# Patient Record
(demographics unavailable — no encounter records)

---

## 2024-12-16 NOTE — BEGINNING OF VISIT
[0] : 2) Feeling down, depressed, or hopeless: Not at all (0) [PHQ-2 Negative] : PHQ-2 Negative [PHQ-9 Negative] : PHQ-9 Negative [Former] : Former [> 15 Years] : > 15 Years [Date Discussed (MM/DD/YY): ___] : Discussed: [unfilled] [Reviewed, no changes] : Reviewed, no changes [Pain Scale: ___] : On a scale of 1-10, today the patient's pain is a(n) [unfilled].

## 2024-12-17 NOTE — HISTORY OF PRESENT ILLNESS
[Disease: _____________________] : Disease: [unfilled] [T: ___] : T[unfilled] [N: ___] : N[unfilled] [M: ___] : M[unfilled] [AJCC Stage: ____] : AJCC Stage: [unfilled] [Therapy: ___] : Therapy: [unfilled] [2] : 2, Moderate [E] : definite [de-identified] : Ms. MOJGAN STRINGER is a 69 year old female here today for evaluation and management of Pancreatic Cancer.\par  \par  She was referred by Dr. Tavera.  She is a 70 yo F with recently diagnosed stage IIB pancreatic adenocarcinoma (dx 1/2020) s/p Whipple (2/12/20) presents for initial visit to discuss adjuvant chemotherapy accompanied by her sister and spouse.   She initially presented to hospital back in January 2020 with an acute episode of epigastric and mid back pain.  Workup revealed cholecystitis and associated episode of pancreatitis with lipase level in the 2000's, presumed to be gallstone pancreatitis.  CT and MRI at that time demonstrated a mildly dilated CBD with narrowing at the mid CBD but no definitive mass identified.  Pancreatic duct was not dilated and bilirubin was WNL.  She underwent ERCP/EUS which showed a 2.3 x 2.2 cm mass at the distal CBD within the parenchyma of the pancreatic head s/p FNB.  Pathology was positive for adenocarcinoma.  Ca 19-9 was 96 pre-operatively and CEA normal.  She had lost approximately 6 lbs, c/o early satiety but no nausea or vomiting.  She does report long-standing constipation and was prescribed Creon but has not started yet.  She was evaluated by Dr. Garcia and underwent Whipple on 2/12/20. Pathology consistent with moderately differentiated PDAC, negative margins, 2/37 LNs positive, MMR intact, pT2N1 stage IIB (see below). She is pending re imaging scans next week and is anticipating the start of chemotherapy.   Her post Whipple CA 19-9 was normal  33 from 3/10/20. \par  \par  She is recovering well. She does complain of constipation but this has been a lifetime ordeal. She is now on Creon  and states its a bit expensive.\par  \par  RADIOLOGIC WORKUP\par  CT Chest (1.31.2020) Impression: 1 cm ill-defined opacity as well as a 0.6 cm solid nodule in the right upper lobe are noted. They are indeterminate based on this exam. Follow-up CT scan is recommended in 3 months to ensure resolution.\par  MR Abd (1.23.2020) IMPRESSION:Findings likely representing a mid-CBD stricture. No evidence of choledocholithiasis. CT from 1/21/2020 demonstrates hyperattenuation/enhancement at this site. Top normal CBD and minimal intrahepatic biliary ductal prominence.  Further evaluation with ERCP is recommended.\par  CT A/P (1.21.2020) IMPRESSION:Gallbladder mucosal enhancement, wall thickening and pericholecystic fluid suggestive of acute cholecystitis. Since the gallbladder is not distended, gallbladder sonography and/or a nuclear medicine hepatobiliary scan may be useful.\par  \par  HCM\par  Upper EGD done 1.27.2020\par  Last colonoscopy (04/2019) reportedly 25+ polyps were removed, none with high grade dysplasia. \par  \par  PATHOLOGY\par  (2.6.2020) Submitted Specimen(s)\par  CONSULTATION: PANCREAS, MASS HEAD, FNA LABELED 63-IY-\par  \par  Submitted Gross Description\par  Consultation.Specimen: Pancreatic Mass, Head, EUS-Guided FNA.\par  Collected 01/27/2020. Received 02/06/2020 are 8 Slides labelled\par  87- Through 99-FZ--1H. All slides labeled with\par  patient's name: Mojgan Stringer from [facility]. [address] [place] [address]. [phone]  [phone]. This review is requested by Dr. Jose Juan Garcia.\par  \par  The interpretation from the originating lab (accession#[id number] Through 1H) reads as follows:  Positive for malignant cells. Adenocarcinoma.\par  \par  Note: Cytology sides show disorganized crowded groups of malignant cells, displaying anisonucleosis, irregular chromatin, and prominent nucleoli. Biopsies show infiltrative glandular epithelium in a desmoplastic stroma, supporting the above interpretation.\par  \par  Submitted Clinical Information Abdominal pain, pancreatitis, 2 cm head mass.\par  \par  Final Diagnosis\par  CONSULTATION: PANCREAS, MASS HEAD, FNA, LABELED 99-AH-\par  POSITIVE FOR MALIGNANT CELLS.\par  Adenocarcinoma. See Note\par  \par  The cytology slides show rare clusters of atypical ductal epithelium with "drunken honey comb" architectural pattern and cytologic atypia, in a background of many stromal fragments and Gastro-intestinal contaminants.  The sections of cell block show cores of desmoplastic stroma with scattered foci of invasive adenocarcinoma. Foci suggestive of pancreatitis are also identified in the background. \par  Note This case was reviewed at daily intradepartmental cytology case\par  review consensus conference with concurrence in the final\par  diagnosis on 02/11/20. 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 [de-identified] : 4/27/2020 Patient is here for a follow-up visit for Pancreatic cancer s/p first cycle of chemo.  Since first cycle with FOLFIRINOX, she has reported persistent diarrhea starting around D3 until D6 of chemo, despite anti-diarrheal agents.  She states for those few days she was having about 4-5 loose BMs/day.  She also reports having intermittent nausea which improved with anti-emetics.  Most recent CBC is stable.  Patient denies fever, chills, vomiting, neuropathy or bleeding.  She was not originally taking Creon at start of chemo but has been taking 2-3x daily for the last few days.  She had a formed BM this morning, but states they are lighter in color.  She reports some early satiety and attributes her 6lb weight loss to that; her PCP is credentialing her for medical marijuana.   CT C/A/P (3.24.2020) IMPRESSION: Status post Whipple with associated postsurgical changes. Haziness in the postoperative bed surrounding the SMA/celiac artery without evidence of recurrent disease.Attention on follow-up.Moderate narrowing at the level of the portosplenic confluence.  Redemonstration of right upper lobe ill-defined opacity and solid nodule, unchanged since prior exam 1/31/2020 and of indeterminant etiology.  5/20/2020 Patient is here for a follow-up visit for Pancreatic cancer on mFOLFIRINOX.  She is due for next cycle in 1 week.  Most recent CBC reviewed which shows mild anemia and leukocytosis likely 2/2 treatment last week.  Her weight has decreased another 7lbs since last visit.  She still reports about 2-5 loose BMs per day.  However she has not been taking the Imodium as frequently as suggested based on her symptoms.  We reviewed instructions for Imodium again.  She missed the last few days of potassium due to losing medication.  She does report poor appetite but no nausea.  She reports generally feeling that she tolerated the regimen a little better with dose reduction.  She has been using CBD to try to increase appetite but has been titrating her dosage because she does not like the side effect of "feeling high."    6/8/2020 Patient is here for a follow-up visit for Pancreatic Cancer.  She is feeling well with no new complaints.  Most recent CBC is stable, only mild anemia and thrombocytopenia, hgb is 10.8g/dL.  Patient denies fever, chills, nausea, vomiting, new pain or bleeding.  She has not had diarrhea for the last few days and states it has generally improved.  She takes the Imodium most days.  She does get some of the neuropathy in her fingertips which is slowly worsening but not bothersome at this time.  She reports feeling well after resuming regular doses with last cycle.    7/6/2020: MIGEL WALSH is a 70 year old female here today for follow up visit for Pancreatic cancer. She completed 5 weeks of FOLFIRINOX; tolerated well besides mild diarrhea 2x daily for a few days. She missed cycle 6 which was due on June 23rd due to episode of fever and chills. T-max 101.3. COVID -, CXR -. UA-, and blood cultures -. She received two days of vanco and cefepime which were later discontinued. She denies any recent fever, chills, stomatitis, bleeding, nausea, or vomiting at this time. She continues to have intermittent diarrhea mostly associated with treatment; improves with Imodium. She states her neuropathy is stable at this time. She is feeling well due to two week vacation from treatment. In addition, she is noted to have continued weight loss. She states her appetite has been well and continues to follow with a nutritionist.   8/3/2020: Patient presents for follow up prior to treatment with mFOLFIRINOX. Her diarrhea has resolved, and she has not required imodium in weeks. She states that 20% dose reduction of irinotecan helped tremendously. She is very concerned regarding increasing alk phos, AST, ALT and has been looking for a hepatologist. [facility] has liver clinic specifically for chemotherapy-induced liver injury, so she would like to make a telehealth appointment. She would like to defer chemotherapy by one week in order to monitor repeat LFTs from today and establish care with a hepatologist. She is concerned about some thickening under her scar tissue in abdominal midline. Also complains of intermittent, diffuse scalp tenderness.  8/24/2020: Patient presents for follow up prior to treatment with mFOLFIRINOX. She saw liver specialist at [facility], who recommended chemotherapy dose reduction and scheduled her for an abdominal MRI later this week. Her LFTs have improved, and she has restarted treatment. States that she feels as though she has pulled a groin muscle from trying to exercise too vigorously. Also complains of numbness and tingling in fingers and toes. She is concerned about losing efficacy of chemotherapy secondary to dose reductions.  9/21/2020: Patient presents for follow up. She was scheduled for cycle 10 of mFOLFIRINOX on 9/23/2020 but wishes to defer treatment by 2 weeks to consider her options. LFTs are slowly decreasing, and abdominal MRI noted only mild hepatosplenomegaly. She has started having neuropathy in the bottoms of her feet and her fingertips and is concerned about this severely impacting her quality of life. Notes that her ankles have become swollen. Also complains that her tongue feels some numbness/tingling, like she burned it on hot food. She is requesting to have her vitamin D and B12 levels checked. Denies diarrhea, abdominal pain, or anorexia.  10/5/2020: Patient presents for evaluation prior to cycle 11 of mFOLFIRINOX. She saw Dr. Eason for a second opinion based on the recommendation of an acquaintance. She is still having numbness and tingling in her hands and feet, although she notes that it is slightly improved from last time. Still has tongue burning sensation, although she notes that it is also improved from last visit. She is requesting that vitamin D levels be checked. After conversation with Dr. Eason, she would like to discontinue oxaliplatin from remaining chemotherapy cycles.  11/16/2020: Patient presents for follow up. For cycles 11 and 12, she received 5-FU, leucovorin, and irinotecan. She reports that her neuropathy is slightly better. She is seeing a functional medicine doctor and taking multiple supplements, including vitamin B complex, vitamin C, magnesium, Vitamin D3, green tea, gingeng, curcumin, and L-glutamine. Concerned about her weight, although it has now stabilized. No nausea, vomiting, diarrhea, and constipation. She is hoping to decrease her creon dosage and eventually discontinue it. Has appointment for CT scans and a visit with her surgeon at [facility] in the end of November 2020.   2/22/21 She feels well.  Gained weight.  She had CTs in 11/2020 was [facility].    6/14/21 She is here for follow up. She had blood work in May 2021 that showed  moderate anemia that is stable,  CMP with persistant AST/ALT and Alk phos elevations  a bit higehr than before CA 19-9 was 20.  She had CT C/A/P in 4/16/21:  IMPRESSION: Stable examination status post procedure. No evidence of recurrent or metastatic disease.  She saw Dr Mckeon and had a colonoscopy 5/28/21: Impressions:   No large lesions seen, but prep was inadequate to detect small or flat polyps or lesions.   Internal hemorrhoids.   Tortuous and redundant colon.  Plan: Advance diet as tolerated reepat colonsocopy in one year with better prep    She feels well otherweise. She pollard weight. She did drink several glasses of wine prior to alst CMP which could explein her LFSts.   10/13/21 Patient is here for a follow-up visit for Pancreatic Cancer.  Patient denies fever, chills, nausea, vomiting or bleeding.  She still experiences some of the neuropathy in her fingertips and her feet, which is slowly improving.  She recently followed with SURG regarding possibility of abdominal hernia repair since she believes they may be growing in size.  Reviewed most recent labwork which shows mild leukopenia and moderate normocytic anemia.  Iron stores were low from 09/2021.  She is taking ferrous gluconate every other day alternating with a vitamin iron supplement (from integrative doctor).  She has CT C/A/P scheduled for 10/29; she knows to give us a copy.  She also takes pancreatic enzymes before each meal + probiotics.    2/16/22 She is here for follow up.  She had CT C/A/P on 10/29/21 she is [facility]. Her CHARLA ahs also been improving on oral iron Hgb was 11.1 last month and ferritin trending  up. She feels well main complaints is here incisional hernia.   6/8/22 Patient is here for a follow-up visit for Pancreatic Cancer.  Patient denies fever, chills, nausea, vomiting or bleeding.  She still experiences some of the neuropathy in her fingertips and her feet, which is slowly improving.  Iron stores were low from 05/2022.  She is taking ferrous gluconate every other day alternating with a vitamin iron supplement (from integrative doctor).  Reviewed most recent CT C/A/P which is stable.   CT C/A/P (6.3.2022) IMPRESSION:No evidence of locally recurrent or metastatic disease.Decrease in pancreatic ductal dilatation.Large amount of stool throughout the colon.  10/3/2022 She returns for follow-up today.  She completed a course of Venofer in July because of iron deficiency that was not getting better on oral iron.  CBC from today is normal iron studies and CMP are also pending.  Repeat CT is ordered for June 2023 by Dr. Howard and she will reach out to them to see if they would like to do it in December of this year.  Her port has been removed as well.  She feels well except for diarrhea and understands she may require Creon again.  She is pending a colonoscopy with Dr. Mckeon later this month  12/23/22 She decided to come in for follow up due to fatigue and nail changes. She feltt she was CHARLA again.    07/05/2023 Reports feeling well, except that on 06/30/2023 she fell in the parking lot and now has residual pain in her left hip; 05/24/2023 S/P incisional hernia repair - healed well; no changes in chronic conditions or new complaints since the last visit.  1/10/24 She is here for follow-up she is doing well.  She had CT chest Abdo pelvis in December 2023 and she is [facility]DRA Will.  CA 19-9 from December was also normal  December 16, 2024.  She is here for follow-up.  CBC was done today which showed a normal CBC she is doing well.   [FreeTextEntry3] : Diarrhea [FreeTextEntry4] : 4/2020 [FreeTextEntry5] : Irinotecan

## 2024-12-17 NOTE — REVIEW OF SYSTEMS
[Negative] : Allergic/Immunologic [Fever] : no fever [Chills] : no chills [Chest Pain] : no chest pain [Palpitations] : no palpitations [Lower Ext Edema] : no lower extremity edema [Shortness Of Breath] : no shortness of breath [Cough] : no cough [Vomiting] : no vomiting [Constipation] : no constipation [Skin Rash] : no skin rash [Easy Bleeding] : no tendency for easy bleeding [FreeTextEntry7] : small incisional hernia  [FreeTextEntry9] : left hip pain S/P fall [de-identified] : Neuropathy of feet and fingertips no complaints today.

## 2024-12-17 NOTE — ASSESSMENT
[FreeTextEntry1] : # pT2N1, stage IIB pancreatic adenocarcinoma (dx 1/2020) s/p Whipple (2/12/2020).  - Receiving adjuvant mFOLFIRINOX. Irinotecan dose reduced by 20% with cycle 3 due to diarrhea.  - Patient reports negative genetic testing, including BRCA.  - MMR testing on FNA (1/27/20) MMR intact.  - Cycle 7 of mFOLFIRINOX deferred secondary to rising LFTs. Patient was already receiving 20% dose reduction in irinotecan secondary to diarrhea. Saw liver specialist at [facility], who recommended dose reduction. Abdominal MRI in 8/28/2020 showed only mild hepatosplenomegaly and hepatic steatosis.  - After seeing Dr. Eason for second opinion, patient requested that oxaliplatin be discontinued from remaining chemotherapy cycles. She is concerned about worsening neuropathy and impact on her quality of life.  - Cycles 11 and 12 of 5-FU, leucovorin, and irinotecan (without oxaliplatin) completed and she is now on Surveillance.  - Patient wishes to continue multiple vitamin and herbal supplements prescribed by functional medicine doctor.  - Patient has surveillance CT scans with surgical team at [facility] from 06/2022.  - 06/2022 CT C/A/P is stable, plan to repeat in 6 months around 12/2022 (she has them ordered by Dr. Garcia but I also explained its reasonable to have them done June 2023 since she is now close to 3 years from her surgery and she decided to have them done in June 2023.  - 04/21/2023 CT C/A/P - Stable examination. No evidence of local recurrence or metastatic disease.  -12/15/2023 CT C/A/P SANDRA, CA 19-9 was 18    # Moderate anemia due to CHARLA likely form malabsorption.  - improved with IV iron.  - she follows with GI.    # Pain in left hip S/P 06/30/2023 fall.      PLAN:  -next CT C/A/P in 12/2024,  This is coming up it was ordered by her surgeon will review results once I am aware of them and they are available  - continue close clinical observation.  -Blood work with repeat Iron studies today, will administer IV iron if needed   -RTC in one year if [facility]A

## 2024-12-17 NOTE — PHYSICAL EXAM
[Fully active, able to carry on all pre-disease performance without restriction] : Status 0 - Fully active, able to carry on all pre-disease performance without restriction [Thin] : thin [Normal] : affect appropriate [de-identified] : Port looks fine. [de-identified] : Well-healed midline scar. two small abdominal hernia seen

## 2024-12-17 NOTE — HISTORY OF PRESENT ILLNESS
[Disease: _____________________] : Disease: [unfilled] [T: ___] : T[unfilled] [N: ___] : N[unfilled] [M: ___] : M[unfilled] [AJCC Stage: ____] : AJCC Stage: [unfilled] [Therapy: ___] : Therapy: [unfilled] [2] : 2, Moderate [E] : definite [de-identified] : Ms. MOJGAN STRINGER is a 69 year old female here today for evaluation and management of Pancreatic Cancer.\par  \par  She was referred by Dr. Tavera.  She is a 70 yo F with recently diagnosed stage IIB pancreatic adenocarcinoma (dx 1/2020) s/p Whipple (2/12/20) presents for initial visit to discuss adjuvant chemotherapy accompanied by her sister and spouse.   She initially presented to hospital back in January 2020 with an acute episode of epigastric and mid back pain.  Workup revealed cholecystitis and associated episode of pancreatitis with lipase level in the 2000's, presumed to be gallstone pancreatitis.  CT and MRI at that time demonstrated a mildly dilated CBD with narrowing at the mid CBD but no definitive mass identified.  Pancreatic duct was not dilated and bilirubin was WNL.  She underwent ERCP/EUS which showed a 2.3 x 2.2 cm mass at the distal CBD within the parenchyma of the pancreatic head s/p FNB.  Pathology was positive for adenocarcinoma.  Ca 19-9 was 96 pre-operatively and CEA normal.  She had lost approximately 6 lbs, c/o early satiety but no nausea or vomiting.  She does report long-standing constipation and was prescribed Creon but has not started yet.  She was evaluated by Dr. Garcia and underwent Whipple on 2/12/20. Pathology consistent with moderately differentiated PDAC, negative margins, 2/37 LNs positive, MMR intact, pT2N1 stage IIB (see below). She is pending re imaging scans next week and is anticipating the start of chemotherapy.   Her post Whipple CA 19-9 was normal  33 from 3/10/20. \par  \par  She is recovering well. She does complain of constipation but this has been a lifetime ordeal. She is now on Creon  and states its a bit expensive.\par  \par  RADIOLOGIC WORKUP\par  CT Chest (1.31.2020) Impression: 1 cm ill-defined opacity as well as a 0.6 cm solid nodule in the right upper lobe are noted. They are indeterminate based on this exam. Follow-up CT scan is recommended in 3 months to ensure resolution.\par  MR Abd (1.23.2020) IMPRESSION:Findings likely representing a mid-CBD stricture. No evidence of choledocholithiasis. CT from 1/21/2020 demonstrates hyperattenuation/enhancement at this site. Top normal CBD and minimal intrahepatic biliary ductal prominence.  Further evaluation with ERCP is recommended.\par  CT A/P (1.21.2020) IMPRESSION:Gallbladder mucosal enhancement, wall thickening and pericholecystic fluid suggestive of acute cholecystitis. Since the gallbladder is not distended, gallbladder sonography and/or a nuclear medicine hepatobiliary scan may be useful.\par  \par  HCM\par  Upper EGD done 1.27.2020\par  Last colonoscopy (04/2019) reportedly 25+ polyps were removed, none with high grade dysplasia. \par  \par  PATHOLOGY\par  (2.6.2020) Submitted Specimen(s)\par  CONSULTATION: PANCREAS, MASS HEAD, FNA LABELED 83-BE-\par  \par  Submitted Gross Description\par  Consultation.Specimen: Pancreatic Mass, Head, EUS-Guided FNA.\par  Collected 01/27/2020. Received 02/06/2020 are 8 Slides labelled\par  63- Through 14-ML--1H. All slides labeled with\par  patient's name: Mojgan Stringer from [facility]. [address] [place] [address]. [phone]  [phone]. This review is requested by Dr. Jose Juan Garcia.\par  \par  The interpretation from the originating lab (accession#[id number] Through 1H) reads as follows:  Positive for malignant cells. Adenocarcinoma.\par  \par  Note: Cytology sides show disorganized crowded groups of malignant cells, displaying anisonucleosis, irregular chromatin, and prominent nucleoli. Biopsies show infiltrative glandular epithelium in a desmoplastic stroma, supporting the above interpretation.\par  \par  Submitted Clinical Information Abdominal pain, pancreatitis, 2 cm head mass.\par  \par  Final Diagnosis\par  CONSULTATION: PANCREAS, MASS HEAD, FNA, LABELED 25-TV-\par  POSITIVE FOR MALIGNANT CELLS.\par  Adenocarcinoma. See Note\par  \par  The cytology slides show rare clusters of atypical ductal epithelium with "drunken honey comb" architectural pattern and cytologic atypia, in a background of many stromal fragments and Gastro-intestinal contaminants.  The sections of cell block show cores of desmoplastic stroma with scattered foci of invasive adenocarcinoma. Foci suggestive of pancreatitis are also identified in the background. \par  Note This case was reviewed at daily intradepartmental cytology case\par  review consensus conference with concurrence in the final\par  diagnosis on 02/11/20. 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 [de-identified] : 4/27/2020 Patient is here for a follow-up visit for Pancreatic cancer s/p first cycle of chemo.  Since first cycle with FOLFIRINOX, she has reported persistent diarrhea starting around D3 until D6 of chemo, despite anti-diarrheal agents.  She states for those few days she was having about 4-5 loose BMs/day.  She also reports having intermittent nausea which improved with anti-emetics.  Most recent CBC is stable.  Patient denies fever, chills, vomiting, neuropathy or bleeding.  She was not originally taking Creon at start of chemo but has been taking 2-3x daily for the last few days.  She had a formed BM this morning, but states they are lighter in color.  She reports some early satiety and attributes her 6lb weight loss to that; her PCP is credentialing her for medical marijuana.   CT C/A/P (3.24.2020) IMPRESSION: Status post Whipple with associated postsurgical changes. Haziness in the postoperative bed surrounding the SMA/celiac artery without evidence of recurrent disease.Attention on follow-up.Moderate narrowing at the level of the portosplenic confluence.  Redemonstration of right upper lobe ill-defined opacity and solid nodule, unchanged since prior exam 1/31/2020 and of indeterminant etiology.  5/20/2020 Patient is here for a follow-up visit for Pancreatic cancer on mFOLFIRINOX.  She is due for next cycle in 1 week.  Most recent CBC reviewed which shows mild anemia and leukocytosis likely 2/2 treatment last week.  Her weight has decreased another 7lbs since last visit.  She still reports about 2-5 loose BMs per day.  However she has not been taking the Imodium as frequently as suggested based on her symptoms.  We reviewed instructions for Imodium again.  She missed the last few days of potassium due to losing medication.  She does report poor appetite but no nausea.  She reports generally feeling that she tolerated the regimen a little better with dose reduction.  She has been using CBD to try to increase appetite but has been titrating her dosage because she does not like the side effect of "feeling high."    6/8/2020 Patient is here for a follow-up visit for Pancreatic Cancer.  She is feeling well with no new complaints.  Most recent CBC is stable, only mild anemia and thrombocytopenia, hgb is 10.8g/dL.  Patient denies fever, chills, nausea, vomiting, new pain or bleeding.  She has not had diarrhea for the last few days and states it has generally improved.  She takes the Imodium most days.  She does get some of the neuropathy in her fingertips which is slowly worsening but not bothersome at this time.  She reports feeling well after resuming regular doses with last cycle.    7/6/2020: MIGEL WALSH is a 70 year old female here today for follow up visit for Pancreatic cancer. She completed 5 weeks of FOLFIRINOX; tolerated well besides mild diarrhea 2x daily for a few days. She missed cycle 6 which was due on June 23rd due to episode of fever and chills. T-max 101.3. COVID -, CXR -. UA-, and blood cultures -. She received two days of vanco and cefepime which were later discontinued. She denies any recent fever, chills, stomatitis, bleeding, nausea, or vomiting at this time. She continues to have intermittent diarrhea mostly associated with treatment; improves with Imodium. She states her neuropathy is stable at this time. She is feeling well due to two week vacation from treatment. In addition, she is noted to have continued weight loss. She states her appetite has been well and continues to follow with a nutritionist.   8/3/2020: Patient presents for follow up prior to treatment with mFOLFIRINOX. Her diarrhea has resolved, and she has not required imodium in weeks. She states that 20% dose reduction of irinotecan helped tremendously. She is very concerned regarding increasing alk phos, AST, ALT and has been looking for a hepatologist. [facility] has liver clinic specifically for chemotherapy-induced liver injury, so she would like to make a telehealth appointment. She would like to defer chemotherapy by one week in order to monitor repeat LFTs from today and establish care with a hepatologist. She is concerned about some thickening under her scar tissue in abdominal midline. Also complains of intermittent, diffuse scalp tenderness.  8/24/2020: Patient presents for follow up prior to treatment with mFOLFIRINOX. She saw liver specialist at [facility], who recommended chemotherapy dose reduction and scheduled her for an abdominal MRI later this week. Her LFTs have improved, and she has restarted treatment. States that she feels as though she has pulled a groin muscle from trying to exercise too vigorously. Also complains of numbness and tingling in fingers and toes. She is concerned about losing efficacy of chemotherapy secondary to dose reductions.  9/21/2020: Patient presents for follow up. She was scheduled for cycle 10 of mFOLFIRINOX on 9/23/2020 but wishes to defer treatment by 2 weeks to consider her options. LFTs are slowly decreasing, and abdominal MRI noted only mild hepatosplenomegaly. She has started having neuropathy in the bottoms of her feet and her fingertips and is concerned about this severely impacting her quality of life. Notes that her ankles have become swollen. Also complains that her tongue feels some numbness/tingling, like she burned it on hot food. She is requesting to have her vitamin D and B12 levels checked. Denies diarrhea, abdominal pain, or anorexia.  10/5/2020: Patient presents for evaluation prior to cycle 11 of mFOLFIRINOX. She saw Dr. Eason for a second opinion based on the recommendation of an acquaintance. She is still having numbness and tingling in her hands and feet, although she notes that it is slightly improved from last time. Still has tongue burning sensation, although she notes that it is also improved from last visit. She is requesting that vitamin D levels be checked. After conversation with Dr. Eason, she would like to discontinue oxaliplatin from remaining chemotherapy cycles.  11/16/2020: Patient presents for follow up. For cycles 11 and 12, she received 5-FU, leucovorin, and irinotecan. She reports that her neuropathy is slightly better. She is seeing a functional medicine doctor and taking multiple supplements, including vitamin B complex, vitamin C, magnesium, Vitamin D3, green tea, gingeng, curcumin, and L-glutamine. Concerned about her weight, although it has now stabilized. No nausea, vomiting, diarrhea, and constipation. She is hoping to decrease her creon dosage and eventually discontinue it. Has appointment for CT scans and a visit with her surgeon at [facility] in the end of November 2020.   2/22/21 She feels well.  Gained weight.  She had CTs in 11/2020 was SANDRA.    6/14/21 She is here for follow up. She had blood work in May 2021 that showed  moderate anemia that is stable,  CMP with persistant AST/ALT and Alk phos elevations  a bit higehr than before CA 19-9 was 20.  She had CT C/A/P in 4/16/21:  IMPRESSION: Stable examination status post procedure. No evidence of recurrent or metastatic disease.  She saw Dr Mckeon and had a colonoscopy 5/28/21: Impressions:   No large lesions seen, but prep was inadequate to detect small or flat polyps or lesions.   Internal hemorrhoids.   Tortuous and redundant colon.  Plan: Advance diet as tolerated reepat colonsocopy in one year with better prep    She feels well otherweise. She pollard weight. She did drink several glasses of wine prior to alst CMP which could explein her LFSts.   10/13/21 Patient is here for a follow-up visit for Pancreatic Cancer.  Patient denies fever, chills, nausea, vomiting or bleeding.  She still experiences some of the neuropathy in her fingertips and her feet, which is slowly improving.  She recently followed with SURG regarding possibility of abdominal hernia repair since she believes they may be growing in size.  Reviewed most recent labwork which shows mild leukopenia and moderate normocytic anemia.  Iron stores were low from 09/2021.  She is taking ferrous gluconate every other day alternating with a vitamin iron supplement (from integrative doctor).  She has CT C/A/P scheduled for 10/29; she knows to give us a copy.  She also takes pancreatic enzymes before each meal + probiotics.    2/16/22 She is here for follow up.  She had CT C/A/P on 10/29/21 she is SANDRA. Her CHARLA ahs also been improving on oral iron Hgb was 11.1 last month and ferritin trending  up. She feels well main complaints is here incisional hernia.   6/8/22 Patient is here for a follow-up visit for Pancreatic Cancer.  Patient denies fever, chills, nausea, vomiting or bleeding.  She still experiences some of the neuropathy in her fingertips and her feet, which is slowly improving.  Iron stores were low from 05/2022.  She is taking ferrous gluconate every other day alternating with a vitamin iron supplement (from integrative doctor).  Reviewed most recent CT C/A/P which is stable.   CT C/A/P (6.3.2022) IMPRESSION:No evidence of locally recurrent or metastatic disease.Decrease in pancreatic ductal dilatation.Large amount of stool throughout the colon.  10/3/2022 She returns for follow-up today.  She completed a course of Venofer in July because of iron deficiency that was not getting better on oral iron.  CBC from today is normal iron studies and CMP are also pending.  Repeat CT is ordered for June 2023 by Dr. Howard and she will reach out to them to see if they would like to do it in December of this year.  Her port has been removed as well.  She feels well except for diarrhea and understands she may require Creon again.  She is pending a colonoscopy with Dr. Mckeon later this month  12/23/22 She decided to come in for follow up due to fatigue and nail changes. She feltt she was CHARLA again.    07/05/2023 Reports feeling well, except that on 06/30/2023 she fell in the parking lot and now has residual pain in her left hip; 05/24/2023 S/P incisional hernia repair - healed well; no changes in chronic conditions or new complaints since the last visit.  1/10/24 She is here for follow-up she is doing well.  She had CT chest Abdo pelvis in December 2023 and she is SANDRA Will.  CA 19-9 from December was also normal  December 16, 2024.  She is here for follow-up.  CBC was done today which showed a normal CBC she is doing well.   [FreeTextEntry3] : Diarrhea [FreeTextEntry4] : 4/2020 [FreeTextEntry5] : Irinotecan

## 2024-12-17 NOTE — PHYSICAL EXAM
[Fully active, able to carry on all pre-disease performance without restriction] : Status 0 - Fully active, able to carry on all pre-disease performance without restriction [Thin] : thin [Normal] : affect appropriate [de-identified] : Port looks fine. [de-identified] : Well-healed midline scar. two small abdominal hernia seen

## 2024-12-17 NOTE — ASSESSMENT
[FreeTextEntry1] : # pT2N1, stage IIB pancreatic adenocarcinoma (dx 1/2020) s/p Whipple (2/12/2020).  - Receiving adjuvant mFOLFIRINOX. Irinotecan dose reduced by 20% with cycle 3 due to diarrhea.  - Patient reports negative genetic testing, including BRCA.  - MMR testing on FNA (1/27/20) MMR intact.  - Cycle 7 of mFOLFIRINOX deferred secondary to rising LFTs. Patient was already receiving 20% dose reduction in irinotecan secondary to diarrhea. Saw liver specialist at [facility], who recommended dose reduction. Abdominal MRI in 8/28/2020 showed only mild hepatosplenomegaly and hepatic steatosis.  - After seeing Dr. Eason for second opinion, patient requested that oxaliplatin be discontinued from remaining chemotherapy cycles. She is concerned about worsening neuropathy and impact on her quality of life.  - Cycles 11 and 12 of 5-FU, leucovorin, and irinotecan (without oxaliplatin) completed and she is now on Surveillance.  - Patient wishes to continue multiple vitamin and herbal supplements prescribed by functional medicine doctor.  - Patient has surveillance CT scans with surgical team at [facility] from 06/2022.  - 06/2022 CT C/A/P is stable, plan to repeat in 6 months around 12/2022 (she has them ordered by Dr. Garcia but I also explained its reasonable to have them done June 2023 since she is now close to 3 years from her surgery and she decided to have them done in June 2023.  - 04/21/2023 CT C/A/P - Stable examination. No evidence of local recurrence or metastatic disease.  -12/15/2023 CT C/A/P SANDRA, CA 19-9 was 18    # Moderate anemia due to CHARLA likely form malabsorption.  - improved with IV iron.  - she follows with GI.    # Pain in left hip S/P 06/30/2023 fall.      PLAN:  -next CT C/A/P in 12/2024,  This is coming up it was ordered by her surgeon will review results once I am aware of them and they are available  - continue close clinical observation.  -Blood work with repeat Iron studies today, will administer IV iron if needed   -RTC in one year if SANDRA

## 2024-12-17 NOTE — PHYSICAL EXAM
[Fully active, able to carry on all pre-disease performance without restriction] : Status 0 - Fully active, able to carry on all pre-disease performance without restriction [Thin] : thin [Normal] : affect appropriate [de-identified] : Port looks fine. [de-identified] : Well-healed midline scar. two small abdominal hernia seen

## 2024-12-17 NOTE — ASSESSMENT
[FreeTextEntry1] : # pT2N1, stage IIB pancreatic adenocarcinoma (dx 1/2020) s/p Whipple (2/12/2020).  - Receiving adjuvant mFOLFIRINOX. Irinotecan dose reduced by 20% with cycle 3 due to diarrhea.  - Patient reports negative genetic testing, including BRCA.  - MMR testing on FNA (1/27/20) MMR intact.  - Cycle 7 of mFOLFIRINOX deferred secondary to rising LFTs. Patient was already receiving 20% dose reduction in irinotecan secondary to diarrhea. Saw liver specialist at [facility], who recommended dose reduction. Abdominal MRI in 8/28/2020 showed only mild hepatosplenomegaly and hepatic steatosis.  - After seeing Dr. Eason for second opinion, patient requested that oxaliplatin be discontinued from remaining chemotherapy cycles. She is concerned about worsening neuropathy and impact on her quality of life.  - Cycles 11 and 12 of 5-FU, leucovorin, and irinotecan (without oxaliplatin) completed and she is now on Surveillance.  - Patient wishes to continue multiple vitamin and herbal supplements prescribed by functional medicine doctor.  - Patient has surveillance CT scans with surgical team at [place] [facility] from 06/2022.  - 06/2022 CT C/A/P is stable, plan to repeat in 6 months around 12/2022 (she has them ordered by Dr. Garcia but I also explained its reasonable to have them done June 2023 since she is now close to 3 years from her surgery and she decided to have them done in June 2023.  - 04/21/2023 CT C/A/P - Stable examination. No evidence of local recurrence or metastatic disease.  -12/15/2023 CT C/A/P SANDRA, CA 19-9 was 18    # Moderate anemia due to CHARLA likely form malabsorption.  - improved with IV iron.  - she follows with GI.    # Pain in left hip S/P 06/30/2023 fall.      PLAN:  -next CT C/A/P in 12/2024,  This is coming up it was ordered by her surgeon will review results once I am aware of them and they are available  - continue close clinical observation.  -Blood work with repeat Iron studies today, will administer IV iron if needed   -RTC in one year if SANDRA

## 2024-12-17 NOTE — REVIEW OF SYSTEMS
[Negative] : Allergic/Immunologic [Fever] : no fever [Chills] : no chills [Chest Pain] : no chest pain [Palpitations] : no palpitations [Lower Ext Edema] : no lower extremity edema [Shortness Of Breath] : no shortness of breath [Cough] : no cough [Vomiting] : no vomiting [Constipation] : no constipation [Skin Rash] : no skin rash [Easy Bleeding] : no tendency for easy bleeding [FreeTextEntry9] : left hip pain S/P fall [FreeTextEntry7] : small incisional hernia  [de-identified] : Neuropathy of feet and fingertips no complaints today.

## 2024-12-17 NOTE — REVIEW OF SYSTEMS
[Negative] : Allergic/Immunologic [Fever] : no fever [Chills] : no chills [Chest Pain] : no chest pain [Palpitations] : no palpitations [Lower Ext Edema] : no lower extremity edema [Shortness Of Breath] : no shortness of breath [Cough] : no cough [Vomiting] : no vomiting [Constipation] : no constipation [Skin Rash] : no skin rash [Easy Bleeding] : no tendency for easy bleeding [FreeTextEntry9] : left hip pain S/P fall [FreeTextEntry7] : small incisional hernia  [de-identified] : Neuropathy of feet and fingertips no complaints today.

## 2024-12-17 NOTE — HISTORY OF PRESENT ILLNESS
[Disease: _____________________] : Disease: [unfilled] [T: ___] : T[unfilled] [N: ___] : N[unfilled] [M: ___] : M[unfilled] [AJCC Stage: ____] : AJCC Stage: [unfilled] [Therapy: ___] : Therapy: [unfilled] [2] : 2, Moderate [E] : definite [de-identified] : Ms. MOJGAN STRINGER is a 69 year old female here today for evaluation and management of Pancreatic Cancer.\par  \par  She was referred by Dr. Tavera.  She is a 68 yo F with recently diagnosed stage IIB pancreatic adenocarcinoma (dx 1/2020) s/p Whipple (2/12/20) presents for initial visit to discuss adjuvant chemotherapy accompanied by her sister and spouse.   She initially presented to hospital back in January 2020 with an acute episode of epigastric and mid back pain.  Workup revealed cholecystitis and associated episode of pancreatitis with lipase level in the 2000's, presumed to be gallstone pancreatitis.  CT and MRI at that time demonstrated a mildly dilated CBD with narrowing at the mid CBD but no definitive mass identified.  Pancreatic duct was not dilated and bilirubin was WNL.  She underwent ERCP/EUS which showed a 2.3 x 2.2 cm mass at the distal CBD within the parenchyma of the pancreatic head s/p FNB.  Pathology was positive for adenocarcinoma.  Ca 19-9 was 96 pre-operatively and CEA normal.  She had lost approximately 6 lbs, c/o early satiety but no nausea or vomiting.  She does report long-standing constipation and was prescribed Creon but has not started yet.  She was evaluated by Dr. Garcia and underwent Whipple on 2/12/20. Pathology consistent with moderately differentiated PDAC, negative margins, 2/37 LNs positive, MMR intact, pT2N1 stage IIB (see below). She is pending re imaging scans next week and is anticipating the start of chemotherapy.   Her post Whipple CA 19-9 was normal  33 from 3/10/20. \par  \par  She is recovering well. She does complain of constipation but this has been a lifetime ordeal. She is now on Creon  and states its a bit expensive.\par  \par  RADIOLOGIC WORKUP\par  CT Chest (1.31.2020) Impression: 1 cm ill-defined opacity as well as a 0.6 cm solid nodule in the right upper lobe are noted. They are indeterminate based on this exam. Follow-up CT scan is recommended in 3 months to ensure resolution.\par  MR Abd (1.23.2020) IMPRESSION:Findings likely representing a mid-CBD stricture. No evidence of choledocholithiasis. CT from 1/21/2020 demonstrates hyperattenuation/enhancement at this site. Top normal CBD and minimal intrahepatic biliary ductal prominence.  Further evaluation with ERCP is recommended.\par  CT A/P (1.21.2020) IMPRESSION:Gallbladder mucosal enhancement, wall thickening and pericholecystic fluid suggestive of acute cholecystitis. Since the gallbladder is not distended, gallbladder sonography and/or a nuclear medicine hepatobiliary scan may be useful.\par  \par  HCM\par  Upper EGD done 1.27.2020\par  Last colonoscopy (04/2019) reportedly 25+ polyps were removed, none with high grade dysplasia. \par  \par  PATHOLOGY\par  (2.6.2020) Submitted Specimen(s)\par  CONSULTATION: PANCREAS, MASS HEAD, FNA LABELED 29-DZ-\par  \par  Submitted Gross Description\par  Consultation.Specimen: Pancreatic Mass, Head, EUS-Guided FNA.\par  Collected 01/27/2020. Received 02/06/2020 are 8 Slides labelled\par  40- Through 89-BL--1H. All slides labeled with\par  patient's name: Mojgan Stringer from [facility]. [address] [place] [address]. [phone]  [phone]. This review is requested by Dr. Jose Juan Garcia.\par  \par  The interpretation from the originating lab (accession#[id number] Through 1H) reads as follows:  Positive for malignant cells. Adenocarcinoma.\par  \par  Note: Cytology sides show disorganized crowded groups of malignant cells, displaying anisonucleosis, irregular chromatin, and prominent nucleoli. Biopsies show infiltrative glandular epithelium in a desmoplastic stroma, supporting the above interpretation.\par  \par  Submitted Clinical Information Abdominal pain, pancreatitis, 2 cm head mass.\par  \par  Final Diagnosis\par  CONSULTATION: PANCREAS, MASS HEAD, FNA, LABELED 74-HY-\par  POSITIVE FOR MALIGNANT CELLS.\par  Adenocarcinoma. See Note\par  \par  The cytology slides show rare clusters of atypical ductal epithelium with "drunken honey comb" architectural pattern and cytologic atypia, in a background of many stromal fragments and Gastro-intestinal contaminants.  The sections of cell block show cores of desmoplastic stroma with scattered foci of invasive adenocarcinoma. Foci suggestive of pancreatitis are also identified in the background. \par  Note This case was reviewed at daily intradepartmental cytology case\par  review consensus conference with concurrence in the final\par  diagnosis on 02/11/20. 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 [de-identified] : 4/27/2020 Patient is here for a follow-up visit for Pancreatic cancer s/p first cycle of chemo.  Since first cycle with FOLFIRINOX, she has reported persistent diarrhea starting around D3 until D6 of chemo, despite anti-diarrheal agents.  She states for those few days she was having about 4-5 loose BMs/day.  She also reports having intermittent nausea which improved with anti-emetics.  Most recent CBC is stable.  Patient denies fever, chills, vomiting, neuropathy or bleeding.  She was not originally taking Creon at start of chemo but has been taking 2-3x daily for the last few days.  She had a formed BM this morning, but states they are lighter in color.  She reports some early satiety and attributes her 6lb weight loss to that; her PCP is credentialing her for medical marijuana.   CT C/A/P (3.24.2020) IMPRESSION: Status post Whipple with associated postsurgical changes. Haziness in the postoperative bed surrounding the SMA/celiac artery without evidence of recurrent disease.Attention on follow-up.Moderate narrowing at the level of the portosplenic confluence.  Redemonstration of right upper lobe ill-defined opacity and solid nodule, unchanged since prior exam 1/31/2020 and of indeterminant etiology.  5/20/2020 Patient is here for a follow-up visit for Pancreatic cancer on mFOLFIRINOX.  She is due for next cycle in 1 week.  Most recent CBC reviewed which shows mild anemia and leukocytosis likely 2/2 treatment last week.  Her weight has decreased another 7lbs since last visit.  She still reports about 2-5 loose BMs per day.  However she has not been taking the Imodium as frequently as suggested based on her symptoms.  We reviewed instructions for Imodium again.  She missed the last few days of potassium due to losing medication.  She does report poor appetite but no nausea.  She reports generally feeling that she tolerated the regimen a little better with dose reduction.  She has been using CBD to try to increase appetite but has been titrating her dosage because she does not like the side effect of "feeling high."    6/8/2020 Patient is here for a follow-up visit for Pancreatic Cancer.  She is feeling well with no new complaints.  Most recent CBC is stable, only mild anemia and thrombocytopenia, hgb is 10.8g/dL.  Patient denies fever, chills, nausea, vomiting, new pain or bleeding.  She has not had diarrhea for the last few days and states it has generally improved.  She takes the Imodium most days.  She does get some of the neuropathy in her fingertips which is slowly worsening but not bothersome at this time.  She reports feeling well after resuming regular doses with last cycle.    7/6/2020: MIGEL WALSH is a 70 year old female here today for follow up visit for Pancreatic cancer. She completed 5 weeks of FOLFIRINOX; tolerated well besides mild diarrhea 2x daily for a few days. She missed cycle 6 which was due on June 23rd due to episode of fever and chills. T-max 101.3. COVID -, CXR -. UA-, and blood cultures -. She received two days of vanco and cefepime which were later discontinued. She denies any recent fever, chills, stomatitis, bleeding, nausea, or vomiting at this time. She continues to have intermittent diarrhea mostly associated with treatment; improves with Imodium. She states her neuropathy is stable at this time. She is feeling well due to two week vacation from treatment. In addition, she is noted to have continued weight loss. She states her appetite has been well and continues to follow with a nutritionist.   8/3/2020: Patient presents for follow up prior to treatment with mFOLFIRINOX. Her diarrhea has resolved, and she has not required imodium in weeks. She states that 20% dose reduction of irinotecan helped tremendously. She is very concerned regarding increasing alk phos, AST, ALT and has been looking for a hepatologist. [facility] has liver clinic specifically for chemotherapy-induced liver injury, so she would like to make a telehealth appointment. She would like to defer chemotherapy by one week in order to monitor repeat LFTs from today and establish care with a hepatologist. She is concerned about some thickening under her scar tissue in abdominal midline. Also complains of intermittent, diffuse scalp tenderness.  8/24/2020: Patient presents for follow up prior to treatment with mFOLFIRINOX. She saw liver specialist at [facility], who recommended chemotherapy dose reduction and scheduled her for an abdominal MRI later this week. Her LFTs have improved, and she has restarted treatment. States that she feels as though she has pulled a groin muscle from trying to exercise too vigorously. Also complains of numbness and tingling in fingers and toes. She is concerned about losing efficacy of chemotherapy secondary to dose reductions.  9/21/2020: Patient presents for follow up. She was scheduled for cycle 10 of mFOLFIRINOX on 9/23/2020 but wishes to defer treatment by 2 weeks to consider her options. LFTs are slowly decreasing, and abdominal MRI noted only mild hepatosplenomegaly. She has started having neuropathy in the bottoms of her feet and her fingertips and is concerned about this severely impacting her quality of life. Notes that her ankles have become swollen. Also complains that her tongue feels some numbness/tingling, like she burned it on hot food. She is requesting to have her vitamin D and B12 levels checked. Denies diarrhea, abdominal pain, or anorexia.  10/5/2020: Patient presents for evaluation prior to cycle 11 of mFOLFIRINOX. She saw Dr. Eason for a second opinion based on the recommendation of an acquaintance. She is still having numbness and tingling in her hands and feet, although she notes that it is slightly improved from last time. Still has tongue burning sensation, although she notes that it is also improved from last visit. She is requesting that vitamin D levels be checked. After conversation with Dr. Eason, she would like to discontinue oxaliplatin from remaining chemotherapy cycles.  11/16/2020: Patient presents for follow up. For cycles 11 and 12, she received 5-FU, leucovorin, and irinotecan. She reports that her neuropathy is slightly better. She is seeing a functional medicine doctor and taking multiple supplements, including vitamin B complex, vitamin C, magnesium, Vitamin D3, green tea, gingeng, curcumin, and L-glutamine. Concerned about her weight, although it has now stabilized. No nausea, vomiting, diarrhea, and constipation. She is hoping to decrease her creon dosage and eventually discontinue it. Has appointment for CT scans and a visit with her surgeon at [facility] in the end of November 2020.   2/22/21 She feels well.  Gained weight.  She had CTs in 11/2020 was SANDRA.    6/14/21 She is here for follow up. She had blood work in May 2021 that showed  moderate anemia that is stable,  CMP with persistant AST/ALT and Alk phos elevations  a bit higehr than before CA 19-9 was 20.  She had CT C/A/P in 4/16/21:  IMPRESSION: Stable examination status post procedure. No evidence of recurrent or metastatic disease.  She saw Dr Mckeon and had a colonoscopy 5/28/21: Impressions:   No large lesions seen, but prep was inadequate to detect small or flat polyps or lesions.   Internal hemorrhoids.   Tortuous and redundant colon.  Plan: Advance diet as tolerated reepat colonsocopy in one year with better prep    She feels well otherweise. She pollard weight. She did drink several glasses of wine prior to alst CMP which could explein her LFSts.   10/13/21 Patient is here for a follow-up visit for Pancreatic Cancer.  Patient denies fever, chills, nausea, vomiting or bleeding.  She still experiences some of the neuropathy in her fingertips and her feet, which is slowly improving.  She recently followed with SURG regarding possibility of abdominal hernia repair since she believes they may be growing in size.  Reviewed most recent labwork which shows mild leukopenia and moderate normocytic anemia.  Iron stores were low from 09/2021.  She is taking ferrous gluconate every other day alternating with a vitamin iron supplement (from integrative doctor).  She has CT C/A/P scheduled for 10/29; she knows to give us a copy.  She also takes pancreatic enzymes before each meal + probiotics.    2/16/22 She is here for follow up.  She had CT C/A/P on 10/29/21 she is SANDRA. Her CHARLA ahs also been improving on oral iron Hgb was 11.1 last month and ferritin trending  up. She feels well main complaints is here incisional hernia.   6/8/22 Patient is here for a follow-up visit for Pancreatic Cancer.  Patient denies fever, chills, nausea, vomiting or bleeding.  She still experiences some of the neuropathy in her fingertips and her feet, which is slowly improving.  Iron stores were low from 05/2022.  She is taking ferrous gluconate every other day alternating with a vitamin iron supplement (from integrative doctor).  Reviewed most recent CT C/A/P which is stable.   CT C/A/P (6.3.2022) IMPRESSION:No evidence of locally recurrent or metastatic disease.Decrease in pancreatic ductal dilatation.Large amount of stool throughout the colon.  10/3/2022 She returns for follow-up today.  She completed a course of Venofer in July because of iron deficiency that was not getting better on oral iron.  CBC from today is normal iron studies and CMP are also pending.  Repeat CT is ordered for June 2023 by Dr. Howard and she will reach out to them to see if they would like to do it in December of this year.  Her port has been removed as well.  She feels well except for diarrhea and understands she may require Creon again.  She is pending a colonoscopy with Dr. Mckeon later this month  12/23/22 She decided to come in for follow up due to fatigue and nail changes. She feltt she was CHARLA again.    07/05/2023 Reports feeling well, except that on 06/30/2023 she fell in the parking lot and now has residual pain in her left hip; 05/24/2023 S/P incisional hernia repair - healed well; no changes in chronic conditions or new complaints since the last visit.  1/10/24 She is here for follow-up she is doing well.  She had CT chest Abdo pelvis in December 2023 and she is SANDRA Will.  CA 19-9 from December was also normal  December 16, 2024.  She is here for follow-up.  CBC was done today which showed a normal CBC she is doing well.   [FreeTextEntry4] : 4/2020 [FreeTextEntry3] : Diarrhea [FreeTextEntry5] : Irinotecan

## 2025-01-16 NOTE — END OF VISIT
[FreeTextEntry3] : By signing my name below, I, Mesuzandisha Lebron, attest that this documentation has been prepared under the direction and in the presence of Jose Juan Garcia MD in the following sections HISTORY OF PRESENT ILLNESS; PAST MEDICAL/FAMILY/SOCIAL HISTORY; REVIEW OF SYSTEMS; PHYSICAL EXAM; ASSESSMENT/PLAN.

## 2025-01-16 NOTE — HISTORY OF PRESENT ILLNESS
[de-identified] : Ms. MIGEL WALSH is a 74-year-old female presenting for 1 year follow up. Her PMHx is significant for HTN, iron deficiency anemia, colonic polyp, hypokalemia, and CASH. Patient is status post Whipple procedure on 2/12/20 for PDAC and underwent FFX and mFFX from 4/13/20-11/16/20. Final pathology of the procedure was consistent with moderately differentiated PDAC, negative margins, 2/37 LNs positive, MMR intact, pT2N1 stage IIB.  She is s/p incisional hernia repair performed on 05/24/2023, healed well.  4/21/2023 A/P CT demonstrated status post Whipple procedure with remaining portions of the pancreas within normal limits, stable operative bed, and no evidence of recurrent disease. 12/15/2023 A/P CT demonstrated no evidence of recurrent or metastatic disease.  Saw med/onc Dr. Harden 12/16/24 who planned for continued surveillance.  Underwent surveillance CAP CT prior to clinic 01/10/2025 which I reviewed and appears stable, final read pending.  Labs: 12/2/23 - CA 19-9: 18, glucose: 66 12/16/24 - Ca 19-9: 17 (stable/similar since 12/2022), glucose: 90  Patient presents for 1 year follow-up. Complains of vertigo for the past couple weeks, seeing ENT. She reports intermittent floating stools depending on how she eats and occasional left-sided abdominal pain postpradially. takes OTC pancreatic enzymes

## 2025-01-16 NOTE — ASSESSMENT
[FreeTextEntry1] : Ms. MIGEL WALSH is a 74-year-old female presenting for 1 follow up s/p Whipple procedure on 2/12/20 for PDAC and underwent FFX and mFFX from 4/13/20-11/16/20. Final pathology of the procedure was consistent with moderately differentiated PDAC, negative margins, 2/37 LNs positive, MMR intact, pT2N1 stage IIB.   Saw med/onc Dr. Harden 12/16/24 who planned for continued surveillance. 12/16/24 - Ca 19-9: 17 (stable/similar since 12/2022), glucose: 90  Underwent surveillance CAP CT prior to clinic 01/10/2025 which I reviewed and appears stable, no evidence of recurrence or metastasis. We will follow with her on the final read. Discussed that the vast majority of recurrences occur in the first 2 years after surgery. I consider patients cured at 10 years out from surgery; she is nearly 5 years out. We will plan to see patient back in 1 year with repeat CT at that time. She plans to f/u with ENT for vertigo and plans to keep a food log regarding GI symptoms and can contact the office in the future should she have any additional questions of it her condition changes.   Today, I personally spent 15 minutes in total time including reviewing imaging and studies, discussing complex treatment regimens, direct face to face time with the patient, patient education and counseling.  ADDENDUM: No evidence of recurrent pancreatic cancer or metastatic disease within the chest, abdomen or pelvis. Left message for patient regarding the results and follow up plan.

## 2025-01-16 NOTE — ASSESSMENT
[FreeTextEntry1] : Ms. MIGEL AWLSH is a 74-year-old female presenting for 1 follow up s/p Whipple procedure on 2/12/20 for PDAC and underwent FFX and mFFX from 4/13/20-11/16/20. Final pathology of the procedure was consistent with moderately differentiated PDAC, negative margins, 2/37 LNs positive, MMR intact, pT2N1 stage IIB.   Saw med/onc Dr. Harden 12/16/24 who planned for continued surveillance. 12/16/24 - Ca 19-9: 17 (stable/similar since 12/2022), glucose: 90  Underwent surveillance CAP CT prior to clinic 01/10/2025 which I reviewed and appears stable, no evidence of recurrence or metastasis. We will follow with her on the final read. Discussed that the vast majority of recurrences occur in the first 2 years after surgery. I consider patients cured at 10 years out from surgery; she is nearly 5 years out. We will plan to see patient back in 1 year with repeat CT at that time. She plans to f/u with ENT for vertigo and plans to keep a food log regarding GI symptoms and can contact the office in the future should she have any additional questions of it her condition changes.   Today, I personally spent 15 minutes in total time including reviewing imaging and studies, discussing complex treatment regimens, direct face to face time with the patient, patient education and counseling.  ADDENDUM: No evidence of recurrent pancreatic cancer or metastatic disease within the chest, abdomen or pelvis. Left message for patient regarding the results and follow up plan.

## 2025-01-16 NOTE — END OF VISIT
[FreeTextEntry3] : By signing my name below, I, Mesuzanidsha Lebron, attest that this documentation has been prepared under the direction and in the presence of Jose Juan Garcia MD in the following sections HISTORY OF PRESENT ILLNESS; PAST MEDICAL/FAMILY/SOCIAL HISTORY; REVIEW OF SYSTEMS; PHYSICAL EXAM; ASSESSMENT/PLAN.

## 2025-01-16 NOTE — HISTORY OF PRESENT ILLNESS
[de-identified] : Ms. MIGEL WALSH is a 74-year-old female presenting for 1 year follow up. Her PMHx is significant for HTN, iron deficiency anemia, colonic polyp, hypokalemia, and CASH. Patient is status post Whipple procedure on 2/12/20 for PDAC and underwent FFX and mFFX from 4/13/20-11/16/20. Final pathology of the procedure was consistent with moderately differentiated PDAC, negative margins, 2/37 LNs positive, MMR intact, pT2N1 stage IIB.  She is s/p incisional hernia repair performed on 05/24/2023, healed well.  4/21/2023 A/P CT demonstrated status post Whipple procedure with remaining portions of the pancreas within normal limits, stable operative bed, and no evidence of recurrent disease. 12/15/2023 A/P CT demonstrated no evidence of recurrent or metastatic disease.  Saw med/onc Dr. Harden 12/16/24 who planned for continued surveillance.  Underwent surveillance CAP CT prior to clinic 01/10/2025 which I reviewed and appears stable, final read pending.  Labs: 12/2/23 - CA 19-9: 18, glucose: 66 12/16/24 - Ca 19-9: 17 (stable/similar since 12/2022), glucose: 90  Patient presents for 1 year follow-up. Complains of vertigo for the past couple weeks, seeing ENT. She reports intermittent floating stools depending on how she eats and occasional left-sided abdominal pain postpradially. takes OTC pancreatic enzymes

## 2025-01-16 NOTE — HISTORY OF PRESENT ILLNESS
[de-identified] : Ms. MIGEL WALSH is a 74-year-old female presenting for 1 year follow up. Her PMHx is significant for HTN, iron deficiency anemia, colonic polyp, hypokalemia, and CASH. Patient is status post Whipple procedure on 2/12/20 for PDAC and underwent FFX and mFFX from 4/13/20-11/16/20. Final pathology of the procedure was consistent with moderately differentiated PDAC, negative margins, 2/37 LNs positive, MMR intact, pT2N1 stage IIB.  She is s/p incisional hernia repair performed on 05/24/2023, healed well.  4/21/2023 A/P CT demonstrated status post Whipple procedure with remaining portions of the pancreas within normal limits, stable operative bed, and no evidence of recurrent disease. 12/15/2023 A/P CT demonstrated no evidence of recurrent or metastatic disease.  Saw med/onc Dr. Harden 12/16/24 who planned for continued surveillance.  Underwent surveillance CAP CT prior to clinic 01/10/2025 which I reviewed and appears stable, final read pending.  Labs: 12/2/23 - CA 19-9: 18, glucose: 66 12/16/24 - Ca 19-9: 17 (stable/similar since 12/2022), glucose: 90  Patient presents for 1 year follow-up. Complains of vertigo for the past couple weeks, seeing ENT. She reports intermittent floating stools depending on how she eats and occasional left-sided abdominal pain postpradially. takes OTC pancreatic enzymes

## 2025-01-16 NOTE — HISTORY OF PRESENT ILLNESS
[de-identified] : Ms. MIGEL WALSH is a 74-year-old female presenting for 1 year follow up. Her PMHx is significant for HTN, iron deficiency anemia, colonic polyp, hypokalemia, and CASH. Patient is status post Whipple procedure on 2/12/20 for PDAC and underwent FFX and mFFX from 4/13/20-11/16/20. Final pathology of the procedure was consistent with moderately differentiated PDAC, negative margins, 2/37 LNs positive, MMR intact, pT2N1 stage IIB.  She is s/p incisional hernia repair performed on 05/24/2023, healed well.  4/21/2023 A/P CT demonstrated status post Whipple procedure with remaining portions of the pancreas within normal limits, stable operative bed, and no evidence of recurrent disease. 12/15/2023 A/P CT demonstrated no evidence of recurrent or metastatic disease.  Saw med/onc Dr. Harden 12/16/24 who planned for continued surveillance.  Underwent surveillance CAP CT prior to clinic 01/10/2025 which I reviewed and appears stable, final read pending.  Labs: 12/2/23 - CA 19-9: 18, glucose: 66 12/16/24 - Ca 19-9: 17 (stable/similar since 12/2022), glucose: 90  Patient presents for 1 year follow-up. Complains of vertigo for the past couple weeks, seeing ENT. She reports intermittent floating stools depending on how she eats and occasional left-sided abdominal pain postpradially. takes OTC pancreatic enzymes

## 2025-01-16 NOTE — HISTORY OF PRESENT ILLNESS
[de-identified] : Ms. MIGEL WALSH is a 74-year-old female presenting for 1 year follow up. Her PMHx is significant for HTN, iron deficiency anemia, colonic polyp, hypokalemia, and CASH. Patient is status post Whipple procedure on 2/12/20 for PDAC and underwent FFX and mFFX from 4/13/20-11/16/20. Final pathology of the procedure was consistent with moderately differentiated PDAC, negative margins, 2/37 LNs positive, MMR intact, pT2N1 stage IIB.  She is s/p incisional hernia repair performed on 05/24/2023, healed well.  4/21/2023 A/P CT demonstrated status post Whipple procedure with remaining portions of the pancreas within normal limits, stable operative bed, and no evidence of recurrent disease. 12/15/2023 A/P CT demonstrated no evidence of recurrent or metastatic disease.  Saw med/onc Dr. Harden 12/16/24 who planned for continued surveillance.  Underwent surveillance CAP CT prior to clinic 01/10/2025 which I reviewed and appears stable, final read pending.  Labs: 12/2/23 - CA 19-9: 18, glucose: 66 12/16/24 - Ca 19-9: 17 (stable/similar since 12/2022), glucose: 90  Patient presents for 1 year follow-up. Complains of vertigo for the past couple weeks, seeing ENT. She reports intermittent floating stools depending on how she eats and occasional left-sided abdominal pain postpradially. takes OTC pancreatic enzymes

## 2025-01-16 NOTE — PHYSICAL EXAM
[FreeTextEntry1] : General: No acute distress. Well nourished and well kept. Head: AT/NC Eyes: PERRL. EOMI. Pulmonary: No respiratory distress. Abdomen: Soft. NT/ND. No rebound, no guarding, no rigidity. No peritoneal signs. No masses. Well healed incision and hernia s/p repair.  Neurological: A&O x 4. Psychiatric: Normal affect and mood.